# Patient Record
Sex: MALE | Race: ASIAN
[De-identification: names, ages, dates, MRNs, and addresses within clinical notes are randomized per-mention and may not be internally consistent; named-entity substitution may affect disease eponyms.]

---

## 2021-12-14 PROBLEM — Z00.00 ENCOUNTER FOR PREVENTIVE HEALTH EXAMINATION: Status: ACTIVE | Noted: 2021-12-14

## 2021-12-20 ENCOUNTER — NON-APPOINTMENT (OUTPATIENT)
Age: 51
End: 2021-12-20

## 2021-12-21 ENCOUNTER — NON-APPOINTMENT (OUTPATIENT)
Age: 51
End: 2021-12-21

## 2021-12-21 ENCOUNTER — APPOINTMENT (OUTPATIENT)
Dept: COLORECTAL SURGERY | Facility: CLINIC | Age: 51
End: 2021-12-21
Payer: MEDICARE

## 2021-12-21 VITALS
HEART RATE: 90 BPM | DIASTOLIC BLOOD PRESSURE: 84 MMHG | WEIGHT: 296 LBS | TEMPERATURE: 97.8 F | SYSTOLIC BLOOD PRESSURE: 144 MMHG

## 2021-12-21 DIAGNOSIS — E11.9 TYPE 2 DIABETES MELLITUS W/OUT COMPLICATIONS: ICD-10-CM

## 2021-12-21 DIAGNOSIS — Z82.49 FAMILY HISTORY OF ISCHEMIC HEART DISEASE AND OTHER DISEASES OF THE CIRCULATORY SYSTEM: ICD-10-CM

## 2021-12-21 DIAGNOSIS — L73.2 HIDRADENITIS SUPPURATIVA: ICD-10-CM

## 2021-12-21 DIAGNOSIS — Z72.89 OTHER PROBLEMS RELATED TO LIFESTYLE: ICD-10-CM

## 2021-12-21 DIAGNOSIS — K60.3 ANAL FISTULA: ICD-10-CM

## 2021-12-21 DIAGNOSIS — I10 ESSENTIAL (PRIMARY) HYPERTENSION: ICD-10-CM

## 2021-12-21 DIAGNOSIS — Z83.3 FAMILY HISTORY OF DIABETES MELLITUS: ICD-10-CM

## 2021-12-21 DIAGNOSIS — Z87.19 PERSONAL HISTORY OF OTHER DISEASES OF THE DIGESTIVE SYSTEM: ICD-10-CM

## 2021-12-21 DIAGNOSIS — F17.200 NICOTINE DEPENDENCE, UNSPECIFIED, UNCOMPLICATED: ICD-10-CM

## 2021-12-21 PROCEDURE — 99203 OFFICE O/P NEW LOW 30 MIN: CPT | Mod: 25

## 2021-12-21 PROCEDURE — 46600 DIAGNOSTIC ANOSCOPY SPX: CPT

## 2021-12-21 NOTE — HISTORY OF PRESENT ILLNESS
[FreeTextEntry1] : 52 y/o M presents for evaluation of\par H/o DM type II, HTN, hidradenitis \par PSH of seton placement, fistulotomy 2017\par \par H/o diverticulitis, reports two episodes in total. Never hospitalized, treated with oral antibiotics. Last episode occurred ~ 1 year ago \par \par Patient reports Dr. Hood attempted to completed a fistulotomy. Previously underwent several procedures with Dr. Lee around 2014, uncertain what the procedures were. Spoke to Dr. Hood last week and referred to our office \par \par MRI pelvis completed 11/3/21 at [facility] as ordered by Dr. Jaspreet Cheung for evaluation of possible fistula\par - right sided superficial fistula\par - no drainable collection or secondary tracts\par \par CT A/P completed 11/17/21 at [facility] as ordered by PILO Reed for abdominal pain\par - no acute abdominopelvic pathology\par - colonic diverticulosis \par \par Recommended to undergo fistulotomy, patient requesting second opinion \par I&D completed at [facility] in late October/early November \par \par 6 months ago began to notice a "cyst" that would intermittent swelling and drainage that is increasing in frequency. Reports perianal swelling that began 3 days ago, requesting I&D today\par Denies fever, chills\par Last noticed drainage a week ago\par Admits to occasional BRBPR on the TP with some but not all BM's\par No sitz baths but allowing the water in the shower to run over the area\par BH: 5 times daily, paste like previously, now 1-3 times daily per wife\par Recently evaluated by GI ([facility]) and advised to bulk up stool and begin fiber supplement. Has been consuming 1 bag of broccoli daily\par No use of stool softeners, fiber supplements or laxatives\par Currently drinking <1 L daily \par Last colonoscopy completed 2017 at [facility], no abnormal findings per patient\par No ASA/NSAIDs last 7 days

## 2021-12-21 NOTE — PHYSICAL EXAM
[FreeTextEntry1] : Medical assistant present for duration of physical examination\par Wife present at request of patient\par \par General no acute distress, alert and oriented\par Psych calm, pleasant demeanor, responding appropriately to questions\par Nonlabored breathing\par Ambulating without assistance\par Skin normal color and pigment, no visible lesions or rashes, post sacral scarring to right of midline\par \par Abdomen obese\par \par Anorectal Exam:\par Inspection no erythema or fluctuance, induration noted of right anterior perianal skin without expressible drainage\par HUAN nontender, no masses palpated, no blood on gloved finger, no fluctuance\par \par Procedure: Anoscopy\par \par Pre procedure Diagnosis: anal fistula\par Post procedure Diagnosis: anal fistula\par Anesthesia: none\par Estimated blood loss: none\par Specimen: none\par Complications: none\par \par Consent obtained. Anoscopy was performed by passing a lighted anoscope with lubricant jelly into the anal canal and the entire anal mucosal surface was inspected. Findings included moderate internal hemorrhoids, no discharge in anal canal\par \par Patient tolerated examination and procedure well.\par \par \par

## 2021-12-21 NOTE — ASSESSMENT
[FreeTextEntry1] : Exam findings and diagnosis were discussed at length with patient and his wife\par Patient presents today as a second opinion. Previously had care at [facility]. Most recently under care of providers at [facility]. Patient provided some recent records from [facility]. Available records reviewed and discussed with patient.\par \par MRI report from [facility] notes right sided superficial fistula.\par Management options discussed, including exam under anesthesia, possible fistulotomy, possible seton placement.\par The nature of the procedure as well as risks and benefits were reviewed with the patient. Risk/benefits/alternatives discussed at length, including but not limited to pain, bleeding, infection, swelling, recurrence of symptoms, need for future surgery, scarring, and risk of alteration of bowel habits, such as seepage, fecal urgency and/or fecal (gas, liquid or solid) incontinence discussed, which may be temporary or permanent. Postprocedural expectations regarding pain, wound cosmesis, and wound healing discussed.  The preoperative, intraoperative, and postoperative management were discussed with the patient in detail. \par \par Patient states he discussed a similar procedure with the surgeon he saw at [facility].\par He also states [facility] providers were considering doing a combined surgery for anal fistula and groin hidradenitis.\par \par Patient is considering his options. He will contact our office if he would like to continue care at [facility].\par If patient would like to proceed with surgery with this practice, we discussed his outside records would need to be obtained for reviewed prior to OR, including CD images of MRI from [facility], and operative/path reports of prior anorectal surgeries from [facility] if available.\par \par